# Patient Record
Sex: FEMALE | Race: WHITE
[De-identification: names, ages, dates, MRNs, and addresses within clinical notes are randomized per-mention and may not be internally consistent; named-entity substitution may affect disease eponyms.]

---
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
## 2024-11-12 NOTE — CT
HISTORY:



Knee pain after falling injury.



FINDINGS:



The knee was studied in the axial plane. Sagittal and coronal 2 dimensional 

reconstructions were then performed.



No findings for fracture, osteochondritis dissecans or dislocation. There 

is trace knee joint effusion present. No loose body is noted. There is a 

shallow benign fibrous cortical defect seen along the posteromedial aspect 

of the distal femoral metaphysis. The extensor mechanism is intact.



IMPRESSION:



Negative study for fracture.



Please note that all CT scans at this facility use dose modulation, 

iterative reconstruction, and/or weight-based dosing when appropriate to 

reduce radiation dose to as low as reasonably achievable.



Dictated by Talha Arthur MD @ Jan 12 2020 10:24AM



Signed by Dr. Talha Arthur @ Jan 12 2020 10:26AM
No indicators present